# Patient Record
Sex: FEMALE | Race: WHITE | Employment: FULL TIME | URBAN - METROPOLITAN AREA
[De-identification: names, ages, dates, MRNs, and addresses within clinical notes are randomized per-mention and may not be internally consistent; named-entity substitution may affect disease eponyms.]

---

## 2018-05-02 ENCOUNTER — HOSPITAL ENCOUNTER (OUTPATIENT)
Age: 48
Discharge: HOME | End: 2018-05-04
Payer: COMMERCIAL

## 2018-05-02 PROCEDURE — 87591 N.GONORRHOEAE DNA AMP PROB: CPT

## 2018-05-02 PROCEDURE — 87491 CHLMYD TRACH DNA AMP PROBE: CPT

## 2018-05-22 LAB
Lab: NORMAL
REPORT: NORMAL
THIS TEST SENT TO: NORMAL

## 2020-01-27 ENCOUNTER — OFFICE VISIT (OUTPATIENT)
Dept: PRIMARY CARE CLINIC | Age: 50
End: 2020-01-27
Payer: COMMERCIAL

## 2020-01-27 ENCOUNTER — HOSPITAL ENCOUNTER (OUTPATIENT)
Age: 50
Discharge: HOME | End: 2020-01-29
Payer: COMMERCIAL

## 2020-01-27 VITALS
BODY MASS INDEX: 37.67 KG/M2 | HEIGHT: 67 IN | WEIGHT: 240 LBS | RESPIRATION RATE: 16 BRPM | OXYGEN SATURATION: 98 % | DIASTOLIC BLOOD PRESSURE: 77 MMHG | SYSTOLIC BLOOD PRESSURE: 114 MMHG | HEART RATE: 84 BPM

## 2020-01-27 PROBLEM — R53.83 OTHER FATIGUE: Status: ACTIVE | Noted: 2020-01-27

## 2020-01-27 PROBLEM — F39 MOOD DISORDER (HCC): Status: ACTIVE | Noted: 2020-01-27

## 2020-01-27 PROBLEM — G47.9 SLEEP DISORDER: Status: ACTIVE | Noted: 2020-01-27

## 2020-01-27 LAB
ALBUMIN SERPL-MCNC: 4.7 G/DL (ref 3.5–5.2)
ALP BLD-CCNC: 52 U/L (ref 35–104)
ALT SERPL-CCNC: 15 U/L (ref 0–32)
ANION GAP SERPL CALCULATED.3IONS-SCNC: 17 MMOL/L (ref 7–16)
AST SERPL-CCNC: 17 U/L (ref 0–31)
BASOPHILS ABSOLUTE: 0.04 E9/L (ref 0–0.2)
BASOPHILS RELATIVE PERCENT: 0.5 % (ref 0–2)
BILIRUB SERPL-MCNC: <0.2 MG/DL (ref 0–1.2)
BUN BLDV-MCNC: 12 MG/DL (ref 6–20)
C-REACTIVE PROTEIN: 0.6 MG/DL (ref 0–0.4)
CALCIUM SERPL-MCNC: 9.6 MG/DL (ref 8.6–10.2)
CHLORIDE BLD-SCNC: 99 MMOL/L (ref 98–107)
CHOLESTEROL, TOTAL: 177 MG/DL (ref 0–199)
CO2: 22 MMOL/L (ref 22–29)
CREAT SERPL-MCNC: 0.9 MG/DL (ref 0.5–1)
EOSINOPHILS ABSOLUTE: 0.33 E9/L (ref 0.05–0.5)
EOSINOPHILS RELATIVE PERCENT: 4 % (ref 0–6)
FOLATE: 17.3 NG/ML (ref 4.8–24.2)
GFR AFRICAN AMERICAN: >60
GFR NON-AFRICAN AMERICAN: >60 ML/MIN/1.73
GLUCOSE BLD-MCNC: 97 MG/DL (ref 74–99)
HBA1C MFR BLD: 5.1 %
HCT VFR BLD CALC: 42 % (ref 34–48)
HDLC SERPL-MCNC: 62 MG/DL
HEMOGLOBIN: 13.5 G/DL (ref 11.5–15.5)
IMMATURE GRANULOCYTES #: 0.03 E9/L
IMMATURE GRANULOCYTES %: 0.4 % (ref 0–5)
LDL CHOLESTEROL CALCULATED: 91 MG/DL (ref 0–99)
LYMPHOCYTES ABSOLUTE: 2.87 E9/L (ref 1.5–4)
LYMPHOCYTES RELATIVE PERCENT: 35 % (ref 20–42)
MCH RBC QN AUTO: 29.3 PG (ref 26–35)
MCHC RBC AUTO-ENTMCNC: 32.1 % (ref 32–34.5)
MCV RBC AUTO: 91.1 FL (ref 80–99.9)
MONOCYTES ABSOLUTE: 0.5 E9/L (ref 0.1–0.95)
MONOCYTES RELATIVE PERCENT: 6.1 % (ref 2–12)
NEUTROPHILS ABSOLUTE: 4.44 E9/L (ref 1.8–7.3)
NEUTROPHILS RELATIVE PERCENT: 54 % (ref 43–80)
PDW BLD-RTO: 13.8 FL (ref 11.5–15)
PLATELET # BLD: 352 E9/L (ref 130–450)
PMV BLD AUTO: 10.2 FL (ref 7–12)
POTASSIUM SERPL-SCNC: 4.4 MMOL/L (ref 3.5–5)
RBC # BLD: 4.61 E12/L (ref 3.5–5.5)
RHEUMATOID FACTOR: 10 IU/ML (ref 0–13)
SEDIMENTATION RATE, ERYTHROCYTE: 0 MM/HR (ref 0–20)
SODIUM BLD-SCNC: 138 MMOL/L (ref 132–146)
T4 FREE: 0.92 NG/DL (ref 0.93–1.7)
TOTAL PROTEIN: 7.4 G/DL (ref 6.4–8.3)
TRIGL SERPL-MCNC: 120 MG/DL (ref 0–149)
TSH SERPL DL<=0.05 MIU/L-ACNC: 2.63 UIU/ML (ref 0.27–4.2)
VITAMIN B-12: 427 PG/ML (ref 211–946)
VITAMIN D 25-HYDROXY: 37 NG/ML (ref 30–100)
VLDLC SERPL CALC-MCNC: 24 MG/DL
WBC # BLD: 8.2 E9/L (ref 4.5–11.5)

## 2020-01-27 PROCEDURE — 99204 OFFICE O/P NEW MOD 45 MIN: CPT | Performed by: FAMILY MEDICINE

## 2020-01-27 PROCEDURE — 86038 ANTINUCLEAR ANTIBODIES: CPT

## 2020-01-27 PROCEDURE — 82306 VITAMIN D 25 HYDROXY: CPT

## 2020-01-27 PROCEDURE — 96372 THER/PROPH/DIAG INJ SC/IM: CPT | Performed by: FAMILY MEDICINE

## 2020-01-27 PROCEDURE — 86225 DNA ANTIBODY NATIVE: CPT

## 2020-01-27 PROCEDURE — 83036 HEMOGLOBIN GLYCOSYLATED A1C: CPT | Performed by: FAMILY MEDICINE

## 2020-01-27 PROCEDURE — 86235 NUCLEAR ANTIGEN ANTIBODY: CPT

## 2020-01-27 PROCEDURE — 86703 HIV-1/HIV-2 1 RESULT ANTBDY: CPT

## 2020-01-27 PROCEDURE — 86431 RHEUMATOID FACTOR QUANT: CPT

## 2020-01-27 PROCEDURE — 82607 VITAMIN B-12: CPT

## 2020-01-27 PROCEDURE — 84443 ASSAY THYROID STIM HORMONE: CPT

## 2020-01-27 PROCEDURE — 80053 COMPREHEN METABOLIC PANEL: CPT

## 2020-01-27 PROCEDURE — 85651 RBC SED RATE NONAUTOMATED: CPT

## 2020-01-27 PROCEDURE — 86140 C-REACTIVE PROTEIN: CPT

## 2020-01-27 PROCEDURE — 85025 COMPLETE CBC W/AUTO DIFF WBC: CPT

## 2020-01-27 PROCEDURE — 84439 ASSAY OF FREE THYROXINE: CPT

## 2020-01-27 PROCEDURE — 80061 LIPID PANEL: CPT

## 2020-01-27 PROCEDURE — 82746 ASSAY OF FOLIC ACID SERUM: CPT

## 2020-01-27 RX ORDER — BETAMETHASONE DIPROPIONATE 0.05 %
OINTMENT (GRAM) TOPICAL
Qty: 45 G | Refills: 1 | Status: SHIPPED
Start: 2020-01-27 | End: 2020-05-28 | Stop reason: SDUPTHER

## 2020-01-27 RX ORDER — BUPROPION HYDROCHLORIDE 300 MG/1
300 TABLET ORAL
COMMUNITY
End: 2020-01-27

## 2020-01-27 RX ORDER — BUPROPION HYDROCHLORIDE 300 MG/1
TABLET ORAL
COMMUNITY
Start: 2019-12-26

## 2020-01-27 RX ORDER — MODAFINIL 200 MG/1
TABLET ORAL
COMMUNITY
Start: 2019-12-26

## 2020-01-27 RX ORDER — PREDNISONE 20 MG/1
TABLET ORAL
Qty: 13 TABLET | Refills: 0 | Status: SHIPPED
Start: 2020-01-27 | End: 2020-05-28 | Stop reason: CLARIF

## 2020-01-27 RX ORDER — FLUOXETINE HYDROCHLORIDE 40 MG/1
CAPSULE ORAL
COMMUNITY
Start: 2019-12-26 | End: 2020-01-27

## 2020-01-27 RX ORDER — DEXAMETHASONE SODIUM PHOSPHATE 4 MG/ML
4 INJECTION, SOLUTION INTRA-ARTICULAR; INTRALESIONAL; INTRAMUSCULAR; INTRAVENOUS; SOFT TISSUE ONCE
Status: COMPLETED | OUTPATIENT
Start: 2020-01-27 | End: 2020-01-27

## 2020-01-27 RX ADMIN — DEXAMETHASONE SODIUM PHOSPHATE 4 MG: 4 INJECTION, SOLUTION INTRA-ARTICULAR; INTRALESIONAL; INTRAMUSCULAR; INTRAVENOUS; SOFT TISSUE at 10:22

## 2020-01-27 ASSESSMENT — ENCOUNTER SYMPTOMS
WHEEZING: 0
EYE DISCHARGE: 0
ABDOMINAL PAIN: 0
COLOR CHANGE: 0
COUGH: 0
CHEST TIGHTNESS: 0
SINUS PRESSURE: 0
DIARRHEA: 0
SHORTNESS OF BREATH: 0
EYE PAIN: 0
CONSTIPATION: 0
VOMITING: 0
BACK PAIN: 0
ABDOMINAL DISTENTION: 0
RHINORRHEA: 0
SORE THROAT: 0

## 2020-01-27 NOTE — PROGRESS NOTES
sounds are normal.      Palpations: Abdomen is soft. Tenderness: There is no abdominal tenderness. Hernia: No hernia is present. Musculoskeletal: Normal range of motion. General: Swelling (trace b/l R>L. negative homans b/l) present. Comments: Crepitus b/l, no joint laxity  Brace in place over R patellar tendon   Skin:     General: Skin is warm and dry. Findings: Rash (scaling and flaking of fingers. No acute infection) present. Neurological:      Mental Status: She is alert and oriented to person, place, and time. Sensory: No sensory deficit. Motor: No weakness. Psychiatric:         Behavior: Behavior normal.         ASSESSMENT/PLAN:  1. Other fatigue  With sleep disorder. Baseline labs as below. Patient reports 17l b intentional weight loss through record review since 12/2017.    2. Dermatitis  - POCT glycosylated hemoglobin (Hb A1C)  - dexamethasone (DECADRON) injection 4 mg  - betamethasone dipropionate (DIPROLENE) 0.05 % ointment; Apply topically bid x 14 days. Dispense: 45 g; Refill: 1  - predniSONE (DELTASONE) 20 MG tablet; Take 3 tabs daily x 2 days, then 2 tabs daily x 2 days, then 1 tab daily x 3 days  Dispense: 13 tablet; Refill: 0    3. Arthralgia, unspecified joint  - US DUP LOWER EXTREMITY RIGHT JESSICA; Future  - XR KNEE RIGHT (3 VIEWS); Future  - POCT glycosylated hemoglobin (Hb A1C)  - KRISTI Screen with Reflex; Future  - RHEUMATOID FACTOR; Future  - ANTI-RNP (CONNOR AB); Future  - dexamethasone (DECADRON) injection 4 mg  - predniSONE (DELTASONE) 20 MG tablet; Take 3 tabs daily x 2 days, then 2 tabs daily x 2 days, then 1 tab daily x 3 days  Dispense: 13 tablet; Refill: 0  - ANTI-DNA ANTIBODY, DOUBLE-STRANDED; Future  - SEDIMENTATION RATE; Future  - C-reactive protein; Future    4. BMI 37.0-37.9, adult  - CBC Auto Differential; Future  - Comprehensive Metabolic Panel; Future  - TSH without Reflex; Future  - Lipid Panel; Future  - Vitamin D 25 Hydroxy;  Future  - HIV Screen; Future  - Vitamin B12; Future  - Folate; Future  - T4, FREE; Future    5. Sleep disorder  Patient following with psychiatry and is on Provigil regimen. Patient reports previous sleep study negative. Consider referral to sleep  in the future prn. Discussed sleep hygiene and patient works the night shift. 6. Mood disorder ([facility] [address]  Stable, with history of bipolar. Patient follows with psychiatry and is on Prozac and Wellbutrin. Recommend counseling as well. 7. IUD (intrauterine device) in place      I have reviewed my findings and recommendations with Nanda Dewey MD  1/27/2020 11:12 AM     Counseled regarding above diagnosis, including possible risks and complications, especially if left uncontrolled. Patient counseled on red flag symptoms and if they occur to go to the ED. Discussed medications risk/benefits and possible side effects and alternatives to treatment. Patient and/or guardian verbalizes understanding, agrees, feels comfortable with and wishes to proceed with above treatment plan. Advised patient regarding importance of keeping up with recommended health maintenance and to schedule as soon as possible if overdue, as this is important in assessing for undiagnosed pathology, especially cancer, as well as protecting against potentially harmful/life threatening disease. Patient and/or guardian verbalizes understanding and agrees with above counseling, assessment and plan. All questions answered. Please note this report has been partially produced using speech recognition software  and may contain errors related to that system including grammar, punctuation and spelling as well as words and phrases that may seem inappropriate. If there are questions or concerns please feel free to contact me to clarify.

## 2020-01-28 LAB
ANTI DNA DOUBLE STRANDED: NEGATIVE
ANTI-NUCLEAR ANTIBODY (ANA): NEGATIVE
ENA TO RNP ANTIBODY: NEGATIVE
HIV-1 AND HIV-2 ANTIBODIES: NORMAL

## 2020-01-29 ENCOUNTER — HOSPITAL ENCOUNTER (OUTPATIENT)
Age: 50
Discharge: HOME | End: 2020-01-31
Payer: COMMERCIAL

## 2020-01-29 ENCOUNTER — HOSPITAL ENCOUNTER (OUTPATIENT)
Dept: GENERAL RADIOLOGY | Age: 50
Discharge: HOME | End: 2020-01-31
Payer: COMMERCIAL

## 2020-01-29 PROCEDURE — 73564 X-RAY EXAM KNEE 4 OR MORE: CPT

## 2020-02-12 ENCOUNTER — OFFICE VISIT (OUTPATIENT)
Dept: PRIMARY CARE CLINIC | Age: 50
End: 2020-02-12
Payer: COMMERCIAL

## 2020-02-12 VITALS
OXYGEN SATURATION: 98 % | HEART RATE: 80 BPM | SYSTOLIC BLOOD PRESSURE: 116 MMHG | WEIGHT: 237 LBS | RESPIRATION RATE: 16 BRPM | HEIGHT: 67 IN | DIASTOLIC BLOOD PRESSURE: 79 MMHG | BODY MASS INDEX: 37.2 KG/M2

## 2020-02-12 PROCEDURE — 96372 THER/PROPH/DIAG INJ SC/IM: CPT | Performed by: FAMILY MEDICINE

## 2020-02-12 PROCEDURE — 99213 OFFICE O/P EST LOW 20 MIN: CPT | Performed by: FAMILY MEDICINE

## 2020-02-12 RX ORDER — LIDOCAINE HYDROCHLORIDE 10 MG/ML
2 INJECTION, SOLUTION INFILTRATION; PERINEURAL ONCE
Status: COMPLETED | OUTPATIENT
Start: 2020-02-12 | End: 2020-02-12

## 2020-02-12 RX ORDER — LIDOCAINE HYDROCHLORIDE 10 MG/ML
2 INJECTION, SOLUTION EPIDURAL; INFILTRATION; INTRACAUDAL; PERINEURAL ONCE
Status: DISCONTINUED | OUTPATIENT
Start: 2020-02-12 | End: 2020-02-12

## 2020-02-12 RX ORDER — TRIAMCINOLONE ACETONIDE 40 MG/ML
40 INJECTION, SUSPENSION INTRA-ARTICULAR; INTRAMUSCULAR ONCE
Status: DISCONTINUED | OUTPATIENT
Start: 2020-02-12 | End: 2020-05-28

## 2020-02-12 RX ORDER — TRIAMCINOLONE ACETONIDE 40 MG/ML
40 INJECTION, SUSPENSION INTRA-ARTICULAR; INTRAMUSCULAR ONCE
Status: COMPLETED | OUTPATIENT
Start: 2020-02-12 | End: 2020-02-12

## 2020-02-12 RX ADMIN — TRIAMCINOLONE ACETONIDE 40 MG: 40 INJECTION, SUSPENSION INTRA-ARTICULAR; INTRAMUSCULAR at 10:19

## 2020-02-12 RX ADMIN — LIDOCAINE HYDROCHLORIDE 2 ML: 10 INJECTION, SOLUTION INFILTRATION; PERINEURAL at 10:20

## 2020-02-12 NOTE — PROGRESS NOTES
Normal rate and regular rhythm. Pulmonary:      Effort: Pulmonary effort is normal.      Breath sounds: Normal breath sounds. Abdominal:      General: Bowel sounds are normal. There is no distension. Palpations: Abdomen is soft. Tenderness: There is no abdominal tenderness. Hernia: No hernia is present. Musculoskeletal:      Comments: Release signed and knee prepped. Topical spray anesthetic applied. An injection of 2ml 1% lidocaine and 1ml 40 mg of kenalog was placed into the right knee in a sterile fashion. Patient tolerated procedure well. Skin:     General: Skin is warm and dry. Neurological:      Mental Status: She is alert and oriented to person, place, and time. ASSESSMENT/PLAN:  1. Arthritis of right knee  - lidocaine 1 % injection 2 mL  - triamcinolone acetonide (KENALOG) injection 40 mg      I have reviewed my findings and recommendations with Yohana Omer MD  2/12/2020 12:59 PM     Counseled regarding above diagnosis, including possible risks and complications, especially if left uncontrolled. Patient counseled on red flag symptoms and if they occur to go to the ED. Discussed medications risk/benefits and possible side effects and alternatives to treatment. Patient and/or guardian verbalizes understanding, agrees, feels comfortable with and wishes to proceed with above treatment plan. Advised patient regarding importance of keeping up with recommended health maintenance and to schedule as soon as possible if overdue, as this is important in assessing for undiagnosed pathology, especially cancer, as well as protecting against potentially harmful/life threatening disease. Patient and/or guardian verbalizes understanding and agrees with above counseling, assessment and plan. All questions answered.     Please note this report has been partially produced using speech recognition software  and may contain errors related to that system including grammar, punctuation and spelling as well as words and phrases that may seem inappropriate. If there are questions or concerns please feel free to contact me to clarify.

## 2020-05-28 ENCOUNTER — OFFICE VISIT (OUTPATIENT)
Dept: FAMILY MEDICINE CLINIC | Age: 50
End: 2020-05-28
Payer: COMMERCIAL

## 2020-05-28 VITALS
BODY MASS INDEX: 36.7 KG/M2 | OXYGEN SATURATION: 97 % | RESPIRATION RATE: 18 BRPM | SYSTOLIC BLOOD PRESSURE: 122 MMHG | WEIGHT: 233.8 LBS | HEIGHT: 67 IN | DIASTOLIC BLOOD PRESSURE: 72 MMHG | TEMPERATURE: 98.6 F | HEART RATE: 86 BPM

## 2020-05-28 PROCEDURE — 20605 DRAIN/INJ JOINT/BURSA W/O US: CPT | Performed by: FAMILY MEDICINE

## 2020-05-28 PROCEDURE — 99213 OFFICE O/P EST LOW 20 MIN: CPT | Performed by: FAMILY MEDICINE

## 2020-05-28 RX ORDER — LIDOCAINE HYDROCHLORIDE 10 MG/ML
2 INJECTION, SOLUTION INFILTRATION; PERINEURAL ONCE
Status: COMPLETED | OUTPATIENT
Start: 2020-05-28 | End: 2020-05-28

## 2020-05-28 RX ORDER — TRIAMCINOLONE ACETONIDE 40 MG/ML
40 INJECTION, SUSPENSION INTRA-ARTICULAR; INTRAMUSCULAR ONCE
Status: COMPLETED | OUTPATIENT
Start: 2020-05-28 | End: 2020-05-28

## 2020-05-28 RX ORDER — BETAMETHASONE DIPROPIONATE 0.05 %
OINTMENT (GRAM) TOPICAL
Qty: 50 G | Refills: 3 | Status: SHIPPED | OUTPATIENT
Start: 2020-05-28

## 2020-05-28 RX ADMIN — LIDOCAINE HYDROCHLORIDE 2 ML: 10 INJECTION, SOLUTION INFILTRATION; PERINEURAL at 08:53

## 2020-05-28 RX ADMIN — TRIAMCINOLONE ACETONIDE 40 MG: 40 INJECTION, SUSPENSION INTRA-ARTICULAR; INTRAMUSCULAR at 08:54

## 2020-05-28 ASSESSMENT — PATIENT HEALTH QUESTIONNAIRE - PHQ9
SUM OF ALL RESPONSES TO PHQ QUESTIONS 1-9: 0
SUM OF ALL RESPONSES TO PHQ9 QUESTIONS 1 & 2: 0
SUM OF ALL RESPONSES TO PHQ QUESTIONS 1-9: 0
1. LITTLE INTEREST OR PLEASURE IN DOING THINGS: 0
2. FEELING DOWN, DEPRESSED OR HOPELESS: 0

## 2020-12-08 ENCOUNTER — OFFICE VISIT (OUTPATIENT)
Dept: FAMILY MEDICINE CLINIC | Age: 50
End: 2020-12-08
Payer: COMMERCIAL

## 2020-12-08 VITALS
TEMPERATURE: 97 F | HEART RATE: 82 BPM | DIASTOLIC BLOOD PRESSURE: 64 MMHG | HEIGHT: 67 IN | SYSTOLIC BLOOD PRESSURE: 116 MMHG | WEIGHT: 234 LBS | OXYGEN SATURATION: 97 % | BODY MASS INDEX: 36.73 KG/M2

## 2020-12-08 PROCEDURE — 90472 IMMUNIZATION ADMIN EACH ADD: CPT | Performed by: FAMILY MEDICINE

## 2020-12-08 PROCEDURE — 99396 PREV VISIT EST AGE 40-64: CPT | Performed by: FAMILY MEDICINE

## 2020-12-08 PROCEDURE — 90732 PPSV23 VACC 2 YRS+ SUBQ/IM: CPT | Performed by: FAMILY MEDICINE

## 2020-12-08 PROCEDURE — 20610 DRAIN/INJ JOINT/BURSA W/O US: CPT | Performed by: FAMILY MEDICINE

## 2020-12-08 PROCEDURE — 90715 TDAP VACCINE 7 YRS/> IM: CPT | Performed by: FAMILY MEDICINE

## 2020-12-08 PROCEDURE — 99213 OFFICE O/P EST LOW 20 MIN: CPT | Performed by: FAMILY MEDICINE

## 2020-12-08 PROCEDURE — 90471 IMMUNIZATION ADMIN: CPT | Performed by: FAMILY MEDICINE

## 2020-12-08 RX ORDER — LIDOCAINE HYDROCHLORIDE 10 MG/ML
2 INJECTION, SOLUTION INFILTRATION; PERINEURAL ONCE
Status: COMPLETED | OUTPATIENT
Start: 2020-12-08 | End: 2020-12-08

## 2020-12-08 RX ORDER — TRIAMCINOLONE ACETONIDE 40 MG/ML
40 INJECTION, SUSPENSION INTRA-ARTICULAR; INTRAMUSCULAR ONCE
Status: COMPLETED | OUTPATIENT
Start: 2020-12-08 | End: 2020-12-08

## 2020-12-08 RX ADMIN — LIDOCAINE HYDROCHLORIDE 2 ML: 10 INJECTION, SOLUTION INFILTRATION; PERINEURAL at 08:48

## 2020-12-08 RX ADMIN — TRIAMCINOLONE ACETONIDE 40 MG: 40 INJECTION, SUSPENSION INTRA-ARTICULAR; INTRAMUSCULAR at 08:47

## 2020-12-08 NOTE — PROGRESS NOTES
[facility])  Family Medicine Outpatient        SUBJECTIVE:  CC: had concerns including Knee Pain (right knee pain; patient would like an injection). HPI:Karyna EDUAR Crowe presented to the clinic for a routine visit. Manfred Garrido is a 52year old female presenting to the office today for a adult health exam and right knee injection for her arthritis. She last had a knee injection 5/28/20 and reports that it helped a lot. She denies any acute concerns. Review of Systems   Constitutional: Negative for appetite change, fatigue and fever. Respiratory: Negative for cough, shortness of breath and wheezing. Cardiovascular: Negative for chest pain and palpitations. Gastrointestinal: Negative for abdominal pain, constipation, diarrhea, nausea and vomiting. Musculoskeletal: Positive for arthralgias. Negative for neck pain. Outpatient Medications Marked as Taking for the 12/8/20 encounter (Office Visit) with Bhanu Donovan MD   Medication Sig Dispense Refill    betamethasone dipropionate (DIPROLENE) 0.05 % ointment Apply topically bid x 14 days. 50 g 3    modafinil (PROVIGIL) 200 MG tablet       buPROPion (WELLBUTRIN XL) 300 MG extended release tablet       FLUoxetine (PROZAC) 20 MG capsule Take 20 mg by mouth daily          I have reviewed all pertinent PMHx, PSHx, FamHx, SocialHx, medications, and allergies and updated history as appropriate. OBJECTIVE    VS: /64   Pulse 82   Temp 97 °F (36.1 °C)   Ht 5' 7\" (1.702 m)   Wt 234 lb (106.1 kg)   LMP 12/02/2020   SpO2 97%   Breastfeeding No   BMI 36.65 kg/m²   Physical Exam  Constitutional:       General: She is not in acute distress. Appearance: She is well-developed. She is not diaphoretic. HENT:      Head: Normocephalic and atraumatic. Eyes:      Conjunctiva/sclera: Conjunctivae normal.      Pupils: Pupils are equal, round, and reactive to light. Neck:      Musculoskeletal: Normal range of motion and neck supple. Cardiovascular:      Rate and Rhythm: Normal rate and regular rhythm. Pulmonary:      Effort: Pulmonary effort is normal.      Breath sounds: Normal breath sounds. Abdominal:      General: Bowel sounds are normal. There is no distension. Palpations: Abdomen is soft. Tenderness: There is no abdominal tenderness. Hernia: No hernia is present. Musculoskeletal:      Comments: Release signed and knee prepped. Topical spray anesthetic applied. An injection of 2ml 1% lidocaine and 1ml 40 mg of kenalog was placed into the right knee in a sterile fashion. Patient tolerated procedure well. Skin:     General: Skin is warm and dry. Neurological:      Mental Status: She is alert and oriented to person, place, and time. ASSESSMENT/PLAN:  1. Adult Health Exam  Physical exam as above. Patient reports obtaining flu vaccine for 3576-3048 season. Tdap and pneumococcal vaccine today. Encourage smoking cessation. Hx of Bipolar depression treated by psychiatry and reported stable. No s/h ideations reported. Sees Dr. Sharita Varner for female health and reports being utd with Mammogram and Pap smear (records pending). Colon cancer screening with Cologuard. 2. Arthritis of right knee  - triamcinolone acetonide (KENALOG-40) injection 40 mg  - lidocaine 1 % injection 2 mL    3. Nicotine dependence  Smoking 4 cigarettes a day. Continue to encourage smoking cessation. Patient is on Wellbutrin. 4. BMI 36    5. Colon Cancer Screening    I have reviewed my findings and recommendations with Eagle Carolina MD  12/8/2020 8:29 AM     Counseled regarding above diagnosis, including possible risks and complications, especially if left uncontrolled. Patient counseled on red flag symptoms and if they occur to go to the ED. Discussed medications risk/benefits and possible side effects and alternatives to treatment. Patient and/or guardian verbalizes understanding, agrees, feels comfortable with and wishes to proceed with above treatment plan. Advised patient regarding importance of keeping up with recommended health maintenance and to schedule as soon as possible if overdue, as this is important in assessing for undiagnosed pathology, especially cancer, as well as protecting against potentially harmful/life threatening disease. Patient and/or guardian verbalizes understanding and agrees with above counseling, assessment and plan. All questions answered. Please note this report has been partially produced using speech recognition software  and may contain errors related to that system including grammar, punctuation and spelling as well as words and phrases that may seem inappropriate. If there are questions or concerns please feel free to contact me to clarify.

## 2020-12-16 ASSESSMENT — ENCOUNTER SYMPTOMS
ABDOMINAL PAIN: 0
DIARRHEA: 0
NAUSEA: 0
COUGH: 0
VOMITING: 0
CONSTIPATION: 0
WHEEZING: 0
SHORTNESS OF BREATH: 0

## 2020-12-22 NOTE — PROGRESS NOTES
Overdue results letter mailed to patient regarding cologuard order.   Electronically signed by Bernetta Boxer on 12/22/2020 at 10:10 AM

## 2021-02-04 ENCOUNTER — HOSPITAL ENCOUNTER (OUTPATIENT)
Age: 51
Discharge: HOME | End: 2021-02-06
Payer: COMMERCIAL

## 2021-02-04 ENCOUNTER — OFFICE VISIT (OUTPATIENT)
Dept: FAMILY MEDICINE CLINIC | Age: 51
End: 2021-02-04
Payer: COMMERCIAL

## 2021-02-04 ENCOUNTER — HOSPITAL ENCOUNTER (OUTPATIENT)
Dept: GENERAL RADIOLOGY | Age: 51
Discharge: HOME | End: 2021-02-06
Payer: COMMERCIAL

## 2021-02-04 VITALS
OXYGEN SATURATION: 97 % | HEIGHT: 67 IN | TEMPERATURE: 98 F | RESPIRATION RATE: 18 BRPM | DIASTOLIC BLOOD PRESSURE: 82 MMHG | BODY MASS INDEX: 37.35 KG/M2 | HEART RATE: 88 BPM | WEIGHT: 238 LBS | SYSTOLIC BLOOD PRESSURE: 120 MMHG

## 2021-02-04 DIAGNOSIS — M25.562 LEFT KNEE PAIN, UNSPECIFIED CHRONICITY: Primary | ICD-10-CM

## 2021-02-04 DIAGNOSIS — M15.8 OTHER OSTEOARTHRITIS INVOLVING MULTIPLE JOINTS: ICD-10-CM

## 2021-02-04 DIAGNOSIS — M25.562 LEFT KNEE PAIN, UNSPECIFIED CHRONICITY: ICD-10-CM

## 2021-02-04 PROCEDURE — 73562 X-RAY EXAM OF KNEE 3: CPT

## 2021-02-04 PROCEDURE — 99213 OFFICE O/P EST LOW 20 MIN: CPT | Performed by: FAMILY MEDICINE

## 2021-02-04 ASSESSMENT — PATIENT HEALTH QUESTIONNAIRE - PHQ9
1. LITTLE INTEREST OR PLEASURE IN DOING THINGS: 0
SUM OF ALL RESPONSES TO PHQ9 QUESTIONS 1 & 2: 0
2. FEELING DOWN, DEPRESSED OR HOPELESS: 0
SUM OF ALL RESPONSES TO PHQ QUESTIONS 1-9: 0

## 2021-02-04 NOTE — PROGRESS NOTES
[facility])  Family Medicine Outpatient        SUBJECTIVE:  CC: had concerns including Knee Pain (Pt here to discuss injection for her L knee pain). HPI:Karyna R Amrita presented to the clinic for a routine visit. Rodri Jurado is a 82-year-old female presenting to the office today with concerns of left knee pain and request is requesting a left intra-articular knee injection. She has a history of right knee arthritis and has done well with intra-articular injections in this joint. She reports over the past month or so her left knee is starting to feel like her right one was prior to starting injections. She denies any trauma to the area. She is using otc analgesics and Voltaren gel without much relief. Review of Systems   Constitutional: Negative for appetite change, fatigue and fever. Respiratory: Negative for cough, shortness of breath and wheezing. Cardiovascular: Negative for chest pain and palpitations. Gastrointestinal: Negative for abdominal pain, constipation, diarrhea, nausea and vomiting. Musculoskeletal: Positive for arthralgias. Negative for back pain. Outpatient Medications Marked as Taking for the 2/4/21 encounter (Office Visit) with Morgan Samayoa MD   Medication Sig Dispense Refill    betamethasone dipropionate (DIPROLENE) 0.05 % ointment Apply topically bid x 14 days. 50 g 3    modafinil (PROVIGIL) 200 MG tablet       buPROPion (WELLBUTRIN XL) 300 MG extended release tablet       FLUoxetine (PROZAC) 40 MG capsule Take 40 mg by mouth daily          I have reviewed all pertinent PMHx, PSHx, FamHx, SocialHx, medications, and allergies and updated history as appropriate. OBJECTIVE    VS: /82   Pulse 88   Temp 98 °F (36.7 °C) (Temporal)   Resp 18   Ht 5' 7\" (1.702 m)   Wt 238 lb (108 kg)   LMP 01/21/2021 (Exact Date)   SpO2 97%   Breastfeeding No   BMI 37.28 kg/m²   Physical Exam  Constitutional:       General: She is not in acute distress. Appearance: She is well-developed. She is not diaphoretic. HENT:      Head: Normocephalic and atraumatic. Eyes:      Conjunctiva/sclera: Conjunctivae normal.      Pupils: Pupils are equal, round, and reactive to light. Neck:      Musculoskeletal: Normal range of motion and neck supple. Cardiovascular:      Rate and Rhythm: Normal rate and regular rhythm. Pulmonary:      Effort: Pulmonary effort is normal.      Breath sounds: Normal breath sounds. Abdominal:      General: Bowel sounds are normal. There is no distension. Palpations: Abdomen is soft. Tenderness: There is no abdominal tenderness. Hernia: No hernia is present. Musculoskeletal: Normal range of motion. General: Tenderness (from left knee cap there is tenderness on palpation to the R superior and L inferior aspect. Neg baker cyst palpated.) present. No swelling. Comments: Negative homans b/l   Skin:     General: Skin is warm and dry. Neurological:      Mental Status: She is alert and oriented to person, place, and time. ASSESSMENT/PLAN:  1. Left knee pain, unspecified chronicity  Suspect Left OA. Will get Left knee xray at this time. Continue current treatment and follow up in the office next week to discuss. - XR KNEE LEFT (3 VIEWS); Future    2. Other osteoarthritis involving multiple joints  - XR KNEE LEFT (3 VIEWS); Future    I have reviewed my findings and recommendations with Bartolo Gilliam MD  2/9/2021 1:35 PM     Counseled regarding above diagnosis, including possible risks and complications, especially if left uncontrolled. Patient counseled on red flag symptoms and if they occur to go to the ED. Discussed medications risk/benefits and possible side effects and alternatives to treatment. Patient and/or guardian verbalizes understanding, agrees, feels comfortable with and wishes to proceed with above treatment plan. Advised patient regarding importance of keeping up with recommended health maintenance and to schedule as soon as possible if overdue, as this is important in assessing for undiagnosed pathology, especially cancer, as well as protecting against potentially harmful/life threatening disease. Patient and/or guardian verbalizes understanding and agrees with above counseling, assessment and plan. All questions answered. Please note this report has been partially produced using speech recognition software  and may contain errors related to that system including grammar, punctuation and spelling as well as words and phrases that may seem inappropriate. If there are questions or concerns please feel free to contact me to clarify.

## 2021-02-09 ENCOUNTER — OFFICE VISIT (OUTPATIENT)
Dept: FAMILY MEDICINE CLINIC | Age: 51
End: 2021-02-09
Payer: COMMERCIAL

## 2021-02-09 VITALS
TEMPERATURE: 97.9 F | OXYGEN SATURATION: 99 % | WEIGHT: 238 LBS | DIASTOLIC BLOOD PRESSURE: 78 MMHG | RESPIRATION RATE: 18 BRPM | HEIGHT: 67 IN | BODY MASS INDEX: 37.35 KG/M2 | HEART RATE: 66 BPM | SYSTOLIC BLOOD PRESSURE: 122 MMHG

## 2021-02-09 DIAGNOSIS — M25.469 SUPRAPATELLAR EFFUSION OF KNEE: Primary | ICD-10-CM

## 2021-02-09 DIAGNOSIS — M25.469 SUPRAPATELLAR EFFUSION OF KNEE: ICD-10-CM

## 2021-02-09 LAB
ALBUMIN SERPL-MCNC: 4.6 G/DL (ref 3.5–5.2)
ALP BLD-CCNC: 50 U/L (ref 35–104)
ALT SERPL-CCNC: 37 U/L (ref 0–32)
ANION GAP SERPL CALCULATED.3IONS-SCNC: 12 MMOL/L (ref 7–16)
AST SERPL-CCNC: 22 U/L (ref 0–31)
BILIRUB SERPL-MCNC: 0.2 MG/DL (ref 0–1.2)
BUN BLDV-MCNC: 21 MG/DL (ref 6–20)
CALCIUM SERPL-MCNC: 9.3 MG/DL (ref 8.6–10.2)
CHLORIDE BLD-SCNC: 100 MMOL/L (ref 98–107)
CHOLESTEROL, TOTAL: 183 MG/DL (ref 0–199)
CO2: 24 MMOL/L (ref 22–29)
CREAT SERPL-MCNC: 1.3 MG/DL (ref 0.5–1)
GFR AFRICAN AMERICAN: 53
GFR NON-AFRICAN AMERICAN: 44 ML/MIN/1.73
GLUCOSE BLD-MCNC: 88 MG/DL (ref 74–99)
HCT VFR BLD CALC: 43.1 % (ref 34–48)
HDLC SERPL-MCNC: 76 MG/DL
HEMOGLOBIN: 13.7 G/DL (ref 11.5–15.5)
LDL CHOLESTEROL CALCULATED: 92 MG/DL (ref 0–99)
MCH RBC QN AUTO: 29.6 PG (ref 26–35)
MCHC RBC AUTO-ENTMCNC: 31.8 % (ref 32–34.5)
MCV RBC AUTO: 93.1 FL (ref 80–99.9)
PDW BLD-RTO: 13.7 FL (ref 11.5–15)
PLATELET # BLD: 351 E9/L (ref 130–450)
PMV BLD AUTO: 10.1 FL (ref 7–12)
POTASSIUM SERPL-SCNC: 4.9 MMOL/L (ref 3.5–5)
RBC # BLD: 4.63 E12/L (ref 3.5–5.5)
SODIUM BLD-SCNC: 136 MMOL/L (ref 132–146)
TOTAL PROTEIN: 7.3 G/DL (ref 6.4–8.3)
TRIGL SERPL-MCNC: 76 MG/DL (ref 0–149)
VITAMIN D 25-HYDROXY: 31 NG/ML (ref 30–100)
VLDLC SERPL CALC-MCNC: 15 MG/DL
WBC # BLD: 8 E9/L (ref 4.5–11.5)

## 2021-02-09 PROCEDURE — 99213 OFFICE O/P EST LOW 20 MIN: CPT | Performed by: FAMILY MEDICINE

## 2021-02-09 PROCEDURE — 96372 THER/PROPH/DIAG INJ SC/IM: CPT | Performed by: FAMILY MEDICINE

## 2021-02-09 RX ORDER — NAPROXEN 250 MG/1
TABLET ORAL
Qty: 30 TABLET | Refills: 0 | Status: SHIPPED | OUTPATIENT
Start: 2021-02-09

## 2021-02-09 RX ORDER — DEXAMETHASONE SODIUM PHOSPHATE 4 MG/ML
4 INJECTION, SOLUTION INTRA-ARTICULAR; INTRALESIONAL; INTRAMUSCULAR; INTRAVENOUS; SOFT TISSUE ONCE
Status: COMPLETED | OUTPATIENT
Start: 2021-02-09 | End: 2021-02-09

## 2021-02-09 RX ORDER — PREDNISONE 20 MG/1
20 TABLET ORAL 2 TIMES DAILY
Qty: 10 TABLET | Refills: 0 | Status: SHIPPED | OUTPATIENT
Start: 2021-02-09 | End: 2021-02-14

## 2021-02-09 RX ADMIN — DEXAMETHASONE SODIUM PHOSPHATE 4 MG: 4 INJECTION, SOLUTION INTRA-ARTICULAR; INTRALESIONAL; INTRAMUSCULAR; INTRAVENOUS; SOFT TISSUE at 08:54

## 2021-02-09 ASSESSMENT — ENCOUNTER SYMPTOMS
COUGH: 0
DIARRHEA: 0
VOMITING: 0
SHORTNESS OF BREATH: 0
VOMITING: 0
COUGH: 0
CONSTIPATION: 0
WHEEZING: 0
NAUSEA: 0
DIARRHEA: 0
ABDOMINAL PAIN: 0
BACK PAIN: 0
SHORTNESS OF BREATH: 0
CONSTIPATION: 0
BLOOD IN STOOL: 0
WHEEZING: 0
ABDOMINAL PAIN: 0
NAUSEA: 0
BACK PAIN: 0

## 2021-02-09 NOTE — PROGRESS NOTES
General: She is not in acute distress. Appearance: She is well-developed. She is not diaphoretic. HENT:      Head: Normocephalic and atraumatic. Eyes:      Conjunctiva/sclera: Conjunctivae normal.      Pupils: Pupils are equal, round, and reactive to light. Neck:      Musculoskeletal: Normal range of motion and neck supple. Cardiovascular:      Rate and Rhythm: Normal rate and regular rhythm. Pulmonary:      Effort: Pulmonary effort is normal.      Breath sounds: Normal breath sounds. Abdominal:      General: Bowel sounds are normal. There is no distension. Palpations: Abdomen is soft. Tenderness: There is no abdominal tenderness. Hernia: No hernia is present. Musculoskeletal: Normal range of motion. General: Tenderness (from left knee cap there is tenderness on palpation to the R superior and L inferior aspect. Neg baker cyst palpated.) present. No swelling. Comments: Negative homans b/l   Skin:     General: Skin is warm and dry. Neurological:      Mental Status: She is alert and oriented to person, place, and time. ASSESSMENT/PLAN:  1. Suprapatellar effusion of knee  Reviewed left knee xray with patient. She declines Orthopedics referral at this time. Decadron x 1 in office today with steroid burst and Naproxen at this time. - dexamethasone (DECADRON) injection 4 mg  - predniSONE (DELTASONE) 20 MG tablet; Take 1 tablet by mouth 2 times daily for 5 days  Dispense: 10 tablet; Refill: 0  - CBC; Future  - Comprehensive Metabolic Panel; Future  - naproxen (NAPROSYN) 250 MG tablet; Take 1-2 tablets q12h x 5 days  Dispense: 30 tablet; Refill: 0    2. BMI 37.0-37.9, adult  - CBC; Future  - Comprehensive Metabolic Panel; Future  - Vitamin D 25 Hydroxy; Future  - Lipid Panel;  Future      I have reviewed my findings and recommendations with Aniya Villaseñor MD  2/9/2021 1:39 PM Counseled regarding above diagnosis, including possible risks and complications, especially if left uncontrolled. Patient counseled on red flag symptoms and if they occur to go to the ED. Discussed medications risk/benefits and possible side effects and alternatives to treatment. Patient and/or guardian verbalizes understanding, agrees, feels comfortable with and wishes to proceed with above treatment plan. Advised patient regarding importance of keeping up with recommended health maintenance and to schedule as soon as possible if overdue, as this is important in assessing for undiagnosed pathology, especially cancer, as well as protecting against potentially harmful/life threatening disease. Patient and/or guardian verbalizes understanding and agrees with above counseling, assessment and plan. All questions answered. Please note this report has been partially produced using speech recognition software  and may contain errors related to that system including grammar, punctuation and spelling as well as words and phrases that may seem inappropriate. If there are questions or concerns please feel free to contact me to clarify.

## 2021-02-11 DIAGNOSIS — R79.89 ELEVATED SERUM CREATININE: Primary | ICD-10-CM

## 2021-02-24 ENCOUNTER — PATIENT MESSAGE (OUTPATIENT)
Dept: FAMILY MEDICINE CLINIC | Age: 51
End: 2021-02-24

## 2021-02-24 DIAGNOSIS — M25.562 CHRONIC PAIN OF LEFT KNEE: Primary | ICD-10-CM

## 2021-02-24 DIAGNOSIS — G89.29 CHRONIC PAIN OF LEFT KNEE: Primary | ICD-10-CM

## 2021-02-24 RX ORDER — PREDNISONE 20 MG/1
20 TABLET ORAL 2 TIMES DAILY
Qty: 10 TABLET | Refills: 0 | Status: SHIPPED | OUTPATIENT
Start: 2021-02-24 | End: 2021-03-01

## 2021-02-24 NOTE — TELEPHONE ENCOUNTER
Recommend PT and can another short course of prednisone 20 mg/bid x 5 days. Re recommend orthopedics referral as well (patient previously declined).

## 2021-02-24 NOTE — TELEPHONE ENCOUNTER
From: Kadie Almaraz  To: Myah Silva MD  Sent: 2/24/2021 9:24 AM EST  Subject: Non-Urgent Medical Question    Hello ,   Well my left felt good while I was taking the prednisone now that the prednisone is done the swelling is back and I have limited movement again . Was wondering if I could have another round of prednisone or what the next step is ?   Thanks   Keila Spence

## 2021-11-30 ENCOUNTER — NURSE ONLY (OUTPATIENT)
Dept: PRIMARY CARE CLINIC | Age: 51
End: 2021-11-30